# Patient Record
Sex: FEMALE | Race: WHITE | NOT HISPANIC OR LATINO | Employment: FULL TIME | URBAN - METROPOLITAN AREA
[De-identification: names, ages, dates, MRNs, and addresses within clinical notes are randomized per-mention and may not be internally consistent; named-entity substitution may affect disease eponyms.]

---

## 2017-01-17 ENCOUNTER — APPOINTMENT (OUTPATIENT)
Dept: WOMENS IMAGING | Facility: HOSPITAL | Age: 52
End: 2017-01-17

## 2017-01-17 PROCEDURE — 77067 SCR MAMMO BI INCL CAD: CPT | Performed by: RADIOLOGY

## 2017-07-28 ENCOUNTER — TELEPHONE (OUTPATIENT)
Dept: FAMILY MEDICINE CLINIC | Facility: CLINIC | Age: 52
End: 2017-07-28

## 2017-07-28 NOTE — TELEPHONE ENCOUNTER
Patient left a message stating that her blood pressure is elevated at 148/98 and 150/86. She asked to be seen today, however we do not have an appointment available. Please contact the patient to schedule next week with Dr. Ernandez.

## 2017-08-04 RX ORDER — SUCRALFATE 1 G/1
TABLET ORAL
Qty: 270 TABLET | Refills: 1 | OUTPATIENT
Start: 2017-08-04

## 2017-08-24 ENCOUNTER — TELEPHONE (OUTPATIENT)
Dept: GASTROENTEROLOGY | Facility: CLINIC | Age: 52
End: 2017-08-24

## 2017-08-24 RX ORDER — SUCRALFATE ORAL 1 G/10ML
1 SUSPENSION ORAL
Qty: 1200 ML | Refills: 0 | Status: SHIPPED | OUTPATIENT
Start: 2017-08-24 | End: 2022-08-01

## 2017-08-24 NOTE — TELEPHONE ENCOUNTER
----- Message from Meg Barber sent at 8/24/2017 10:09 AM EDT -----  Regarding: PT CALLED - SCRIPT FOR LIQUID CARAFATE  Contact: [phone]  PT ASK IF SHE COULD GO BACK TO THE LIQUID CARAFATE. IT WORKS BETTER THAN THE PILLS. PT SANAM O/V IN SEP.

## 2017-08-24 NOTE — TELEPHONE ENCOUNTER
Returned patient's phone call, advised will order Sucralfate suspension and to call back if her insurance will not pay for the suspension or is too expensive. She verb understanding.

## 2017-09-06 ENCOUNTER — OFFICE VISIT (OUTPATIENT)
Dept: GASTROENTEROLOGY | Facility: CLINIC | Age: 52
End: 2017-09-06

## 2017-09-06 VITALS
SYSTOLIC BLOOD PRESSURE: 122 MMHG | DIASTOLIC BLOOD PRESSURE: 82 MMHG | TEMPERATURE: 98.2 F | HEIGHT: 66 IN | BODY MASS INDEX: 26.48 KG/M2 | WEIGHT: 164.8 LBS

## 2017-09-06 DIAGNOSIS — K63.5 COLON POLYPS: ICD-10-CM

## 2017-09-06 DIAGNOSIS — R13.10 DYSPHAGIA, UNSPECIFIED TYPE: Primary | ICD-10-CM

## 2017-09-06 DIAGNOSIS — K21.9 GASTROESOPHAGEAL REFLUX DISEASE WITHOUT ESOPHAGITIS: ICD-10-CM

## 2017-09-06 PROCEDURE — 99213 OFFICE O/P EST LOW 20 MIN: CPT | Performed by: NURSE PRACTITIONER

## 2017-09-06 NOTE — PROGRESS NOTES
"Chief Complaint   Patient presents with   • Heartburn     HPI    51-year-old female patient of Dr. Wu's followed for history of GERD and colon polyps.  Last evaluated in the office in 2016 for GERD follow-up and medication refill.    Patient presents today stating that she has been off of all medications including her over-the-counter PPI and Carafate since shortly after her office follow-up in August of last year.  She reports that she has been doing well with no issues related to GERD until approximately 3-4 weeks ago.  She reports a shoulder injury and was placed on a 2 week course of ibuprofen as well as prednisone.  After completion of the medication she developed epigastric and stomach pain that she described as \"raw and inflamed feeling\".  She called the office and was restarted on Carafate approximately 2 weeks ago and restarted her over-the-counter PPI.  She reports significant improvement in the discomfort and the burning sensation.  But she does complain of \"acid in the back of her throat\" and feels as if something his hanging in her throat when she tries to swallow.  She will clear her throat and the sensation is alleviated.  She has not had to vomit to alleviate a food bolus.  She does have a lot of anxiety related to this as her brother is  from esophageal cancer.  Prior EGD was performed in 2014 with normal findings.    Prior colonoscopy performed in 2014 with findings of hyperplastic polyps.  She is in recall for a follow-up colonoscopy in 2019.    Past Medical History:   Diagnosis Date   • Asthma    • Foot fracture    • Hypothyroidism    • Tennis elbow    • Vitamin D deficiency      Past Surgical History:   Procedure Laterality Date   • CHOLECYSTECTOMY     • COLONOSCOPY W/ POLYPECTOMY  2014    One 7 mm hyperplastic polyp in transverse colon, incomplete resection, no-bleeding internal hemorrhoids, examined portion of ileum normal   • HYSTERECTOMY   "   • THYROIDECTOMY, PARTIAL      right side    • UPPER GASTROINTESTINAL ENDOSCOPY  12/09/2014    z-line irreg, mild chronic inflammation       Current Outpatient Prescriptions   Medication Sig Dispense Refill   • budesonide-formoterol (SYMBICORT) 80-4.5 MCG/ACT inhaler 2 (two) times a day.     • Cholecalciferol (VITAMIN D) 1000 UNITS tablet Take  by mouth.     • levothyroxine (SYNTHROID) 150 MCG tablet Take 1 tablet by mouth Daily. 90 tablet 3   • mometasone (NASONEX) 50 MCG/ACT nasal spray into each nostril daily.     • montelukast (SINGULAIR) 10 MG tablet Take  by mouth.     • omeprazole OTC (PRILOSEC OTC) 20 MG EC tablet Take  by mouth daily.     • sucralfate (CARAFATE) 1 GM/10ML suspension Take 10 mL by mouth 4 (Four) Times a Day With Meals & at Bedtime. 1200 mL 0     No current facility-administered medications for this visit.        PRN Meds:.    Allergies   Allergen Reactions   • Aspirin Other (See Comments)   • Dexlansoprazole        Social History     Social History   • Marital status: Single     Spouse name: N/A   • Number of children: N/A   • Years of education: N/A     Occupational History   • Not on file.     Social History Main Topics   • Smoking status: Former Smoker     Types: Cigarettes     Quit date: 8/22/2006   • Smokeless tobacco: Not on file   • Alcohol use No   • Drug use: No   • Sexual activity: Not on file     Other Topics Concern   • Not on file     Social History Narrative       Family History   Problem Relation Age of Onset   • Moore's esophagus Mother        Review of Systems   Constitutional: Negative for activity change, appetite change and unexpected weight change.   HENT: Positive for trouble swallowing.    Respiratory: Negative for cough and choking.    Gastrointestinal: Positive for abdominal pain and nausea. Negative for constipation, diarrhea and vomiting.   Allergic/Immunologic: Negative for immunocompromised state.       Vitals:    09/06/17 0931   BP: 122/82   Temp: 98.2 °F  "(36.8 °C)     /82  Temp 98.2 °F (36.8 °C)  Ht 66\" (167.6 cm)  Wt 164 lb 12.8 oz (74.8 kg)  BMI 26.6 kg/m2     Physical Exam   Constitutional: She is oriented to person, place, and time. She appears well-developed and well-nourished. No distress.   HENT:   Head: Normocephalic and atraumatic.   Eyes: No scleral icterus.   Cardiovascular: Normal rate and regular rhythm.    Pulmonary/Chest: Effort normal and breath sounds normal.   Abdominal: Soft. Bowel sounds are normal.   Mild epigastric tenderness   Neurological: She is alert and oriented to person, place, and time.   Skin: Skin is warm and dry.   Psychiatric: She has a normal mood and affect.   Vitals reviewed.      ASSESSMENT AND PLAN    Lise was seen today for heartburn.    Diagnoses and all orders for this visit:    Dysphagia, unspecified type  Comments:  Recent onset after taking steroids/motrin for shoulder injury. Plan for EGD  Orders:  -     Case Request; Standing  -     Case Request    Gastroesophageal reflux disease without esophagitis  Comments:  EGD 12/14 normal  Orders:  -     Case Request; Standing  -     Case Request    Colon polyps  Comments:  c-scope 12/20174 wtih HP, recall scope planned for 12/2019    Other orders  -     Obtain informed consent; Standing    Continue Carafate and over-the-counter PPI.  Plan for EGD for evaluation of dysphagia and GERD symptoms with recent NSAID and steroid use.  Further treatment plan pending outcome of endoscopy findings.  Recall colonoscopy planned for December 2019 with findings of prior colon polyps with pathology consistent with hyperplastic polyp        Nelli Best, JARRET   [facility]  [address] [place] [address]  Office: [phone]    "

## 2017-09-13 ENCOUNTER — OUTSIDE FACILITY SERVICE (OUTPATIENT)
Dept: GASTROENTEROLOGY | Facility: CLINIC | Age: 52
End: 2017-09-13

## 2017-09-13 PROCEDURE — 43239 EGD BIOPSY SINGLE/MULTIPLE: CPT | Performed by: INTERNAL MEDICINE

## 2017-09-19 ENCOUNTER — TELEPHONE (OUTPATIENT)
Dept: GASTROENTEROLOGY | Facility: CLINIC | Age: 52
End: 2017-09-19

## 2017-09-19 NOTE — TELEPHONE ENCOUNTER
Patient called, advised of Dr. Wu's note. She verb understanding and will call back for f/u appt.

## 2017-10-23 RX ORDER — SUCRALFATE 1 G/10ML
SUSPENSION ORAL
Qty: 1200 ML | Refills: 0 | OUTPATIENT
Start: 2017-10-23

## 2018-01-23 NOTE — TELEPHONE ENCOUNTER
----- Message from Ranjit Wu MD sent at 9/19/2017  4:19 PM EDT -----  Regarding: FW: Path / Procedure  Pathology is benign.  Office visit 4 mos      ----- Message -----     From: Eduar Jalloh     Sent: 9/19/2017   1:41 PM       To: Ranjit Wu MD  Subject: Path / Procedure                                 Scanned in     Statement Selected

## 2018-03-23 ENCOUNTER — APPOINTMENT (OUTPATIENT)
Dept: WOMENS IMAGING | Facility: HOSPITAL | Age: 53
End: 2018-03-23

## 2018-03-23 PROCEDURE — 77067 SCR MAMMO BI INCL CAD: CPT | Performed by: RADIOLOGY

## 2018-04-09 ENCOUNTER — TELEPHONE (OUTPATIENT)
Dept: ENDOCRINOLOGY | Age: 53
End: 2018-04-09

## 2018-04-09 NOTE — TELEPHONE ENCOUNTER
----- Message from JARRET Orellana sent at 4/6/2018  3:34 PM EDT -----  Contact: PATIENT   90 day no refill. Needs to keep appt or be seen sooner  ----- Message -----  From: Olga Lidia Bhagat MA  Sent: 4/6/2018   3:07 PM  To: JARRET Orellana    Patient has not been seen since 11/2016, do you want me to send any refills?    ----- Message -----  From: Francisca Paige  Sent: 4/6/2018   2:16 PM  To: Olga Lidia Bhagat MA    I SCHEDULED PATIENT AN APPT WITH [facility] 8-9-18 WITH LAB APPT 7-26-18 AND PUT HER ON WAIT LIST.     SHE IS ASKING FOR SYNTHROID SCRIPT, SHE DOES NOT HAVE STRENGTH AVAILABLE,  1XDAY,  90 DAY SUPPLY DUE TO IT WILL COST A LOT MORE IF SHE CAN ONLY GET A 30 DAY SUPPLY.     SHE HAS 2 PILLS LEFT     [facility] - [phone]    Paz sent in refill on 4/7/2018 for the medication listed above

## 2019-06-18 ENCOUNTER — APPOINTMENT (OUTPATIENT)
Dept: WOMENS IMAGING | Facility: HOSPITAL | Age: 54
End: 2019-06-18

## 2019-06-18 PROCEDURE — 77063 BREAST TOMOSYNTHESIS BI: CPT | Performed by: RADIOLOGY

## 2019-06-18 PROCEDURE — 77067 SCR MAMMO BI INCL CAD: CPT | Performed by: RADIOLOGY

## 2020-12-29 ENCOUNTER — APPOINTMENT (OUTPATIENT)
Dept: WOMENS IMAGING | Facility: HOSPITAL | Age: 55
End: 2020-12-29

## 2020-12-29 PROCEDURE — 77063 BREAST TOMOSYNTHESIS BI: CPT | Performed by: RADIOLOGY

## 2020-12-29 PROCEDURE — 77067 SCR MAMMO BI INCL CAD: CPT | Performed by: RADIOLOGY

## 2021-02-01 ENCOUNTER — APPOINTMENT (OUTPATIENT)
Dept: WOMENS IMAGING | Facility: HOSPITAL | Age: 56
End: 2021-02-01

## 2021-02-01 PROCEDURE — 76641 ULTRASOUND BREAST COMPLETE: CPT | Performed by: RADIOLOGY

## 2021-02-01 PROCEDURE — 77065 DX MAMMO INCL CAD UNI: CPT | Performed by: RADIOLOGY

## 2021-02-01 PROCEDURE — G0279 TOMOSYNTHESIS, MAMMO: HCPCS | Performed by: RADIOLOGY

## 2021-02-01 PROCEDURE — 77061 BREAST TOMOSYNTHESIS UNI: CPT | Performed by: RADIOLOGY

## 2021-02-28 ENCOUNTER — HOSPITAL ENCOUNTER (EMERGENCY)
Facility: HOSPITAL | Age: 56
Discharge: HOME | End: 2021-02-28
Attending: EMERGENCY MEDICINE | Admitting: EMERGENCY MEDICINE

## 2021-02-28 ENCOUNTER — APPOINTMENT (OUTPATIENT)
Dept: GENERAL RADIOLOGY | Facility: HOSPITAL | Age: 56
End: 2021-02-28

## 2021-02-28 VITALS
BODY MASS INDEX: 25.71 KG/M2 | RESPIRATION RATE: 16 BRPM | HEIGHT: 66 IN | TEMPERATURE: 98.5 F | SYSTOLIC BLOOD PRESSURE: 119 MMHG | HEART RATE: 82 BPM | OXYGEN SATURATION: 100 % | DIASTOLIC BLOOD PRESSURE: 63 MMHG | WEIGHT: 160 LBS

## 2021-02-28 DIAGNOSIS — R19.7 VOMITING AND DIARRHEA: Primary | ICD-10-CM

## 2021-02-28 DIAGNOSIS — U07.1 COVID-19 VIRUS INFECTION: ICD-10-CM

## 2021-02-28 DIAGNOSIS — R11.10 VOMITING AND DIARRHEA: Primary | ICD-10-CM

## 2021-02-28 LAB
ALBUMIN SERPL-MCNC: 3.8 G/DL (ref 3.5–5.2)
ALBUMIN/GLOB SERPL: 1.3 G/DL
ALP SERPL-CCNC: 62 U/L (ref 39–117)
ALT SERPL W P-5'-P-CCNC: 16 U/L (ref 1–33)
ANION GAP SERPL CALCULATED.3IONS-SCNC: 10.5 MMOL/L (ref 5–15)
AST SERPL-CCNC: 15 U/L (ref 1–32)
BACTERIA UR QL AUTO: ABNORMAL /HPF
BASOPHILS # BLD AUTO: 0.02 10*3/MM3 (ref 0–0.2)
BASOPHILS NFR BLD AUTO: 0.2 % (ref 0–1.5)
BILIRUB SERPL-MCNC: 0.2 MG/DL (ref 0–1.2)
BILIRUB UR QL STRIP: NEGATIVE
BUN SERPL-MCNC: 8 MG/DL (ref 6–20)
BUN/CREAT SERPL: 13.3 (ref 7–25)
CALCIUM SPEC-SCNC: 8.8 MG/DL (ref 8.6–10.5)
CHLORIDE SERPL-SCNC: 101 MMOL/L (ref 98–107)
CLARITY UR: CLEAR
CO2 SERPL-SCNC: 27.5 MMOL/L (ref 22–29)
COLOR UR: YELLOW
CREAT SERPL-MCNC: 0.6 MG/DL (ref 0.57–1)
DEPRECATED RDW RBC AUTO: 42.5 FL (ref 37–54)
EOSINOPHIL # BLD AUTO: 0.02 10*3/MM3 (ref 0–0.4)
EOSINOPHIL NFR BLD AUTO: 0.2 % (ref 0.3–6.2)
ERYTHROCYTE [DISTWIDTH] IN BLOOD BY AUTOMATED COUNT: 13.4 % (ref 12.3–15.4)
GFR SERPL CREATININE-BSD FRML MDRD: 103 ML/MIN/1.73
GLOBULIN UR ELPH-MCNC: 3 GM/DL
GLUCOSE SERPL-MCNC: 105 MG/DL (ref 65–99)
GLUCOSE UR STRIP-MCNC: NEGATIVE MG/DL
HCT VFR BLD AUTO: 31.3 % (ref 34–46.6)
HGB BLD-MCNC: 9.9 G/DL (ref 12–15.9)
HGB UR QL STRIP.AUTO: ABNORMAL
HYALINE CASTS UR QL AUTO: ABNORMAL /LPF
IMM GRANULOCYTES # BLD AUTO: 0.08 10*3/MM3 (ref 0–0.05)
IMM GRANULOCYTES NFR BLD AUTO: 0.8 % (ref 0–0.5)
KETONES UR QL STRIP: ABNORMAL
LEUKOCYTE ESTERASE UR QL STRIP.AUTO: ABNORMAL
LIPASE SERPL-CCNC: 22 U/L (ref 13–60)
LYMPHOCYTES # BLD AUTO: 0.67 10*3/MM3 (ref 0.7–3.1)
LYMPHOCYTES NFR BLD AUTO: 6.7 % (ref 19.6–45.3)
MCH RBC QN AUTO: 27.2 PG (ref 26.6–33)
MCHC RBC AUTO-ENTMCNC: 31.6 G/DL (ref 31.5–35.7)
MCV RBC AUTO: 86 FL (ref 79–97)
MONOCYTES # BLD AUTO: 0.33 10*3/MM3 (ref 0.1–0.9)
MONOCYTES NFR BLD AUTO: 3.3 % (ref 5–12)
NEUTROPHILS NFR BLD AUTO: 8.81 10*3/MM3 (ref 1.7–7)
NEUTROPHILS NFR BLD AUTO: 88.8 % (ref 42.7–76)
NITRITE UR QL STRIP: NEGATIVE
NRBC BLD AUTO-RTO: 0 /100 WBC (ref 0–0.2)
PH UR STRIP.AUTO: 6.5 [PH] (ref 5–8)
PLATELET # BLD AUTO: 246 10*3/MM3 (ref 140–450)
PMV BLD AUTO: 9.6 FL (ref 6–12)
POTASSIUM SERPL-SCNC: 3.8 MMOL/L (ref 3.5–5.2)
PROT SERPL-MCNC: 6.8 G/DL (ref 6–8.5)
PROT UR QL STRIP: NEGATIVE
RBC # BLD AUTO: 3.64 10*6/MM3 (ref 3.77–5.28)
RBC # UR: ABNORMAL /HPF
REF LAB TEST METHOD: ABNORMAL
SODIUM SERPL-SCNC: 139 MMOL/L (ref 136–145)
SP GR UR STRIP: <1.005 (ref 1–1.03)
SQUAMOUS #/AREA URNS HPF: ABNORMAL /HPF
UROBILINOGEN UR QL STRIP: ABNORMAL
WBC # BLD AUTO: 9.93 10*3/MM3 (ref 3.4–10.8)
WBC UR QL AUTO: ABNORMAL /HPF

## 2021-02-28 PROCEDURE — 80053 COMPREHEN METABOLIC PANEL: CPT | Performed by: NURSE PRACTITIONER

## 2021-02-28 PROCEDURE — 71045 X-RAY EXAM CHEST 1 VIEW: CPT

## 2021-02-28 PROCEDURE — 96361 HYDRATE IV INFUSION ADD-ON: CPT

## 2021-02-28 PROCEDURE — 83690 ASSAY OF LIPASE: CPT | Performed by: NURSE PRACTITIONER

## 2021-02-28 PROCEDURE — 81001 URINALYSIS AUTO W/SCOPE: CPT | Performed by: NURSE PRACTITIONER

## 2021-02-28 PROCEDURE — 25010000002 ONDANSETRON PER 1 MG: Performed by: NURSE PRACTITIONER

## 2021-02-28 PROCEDURE — 96375 TX/PRO/DX INJ NEW DRUG ADDON: CPT

## 2021-02-28 PROCEDURE — 85025 COMPLETE CBC W/AUTO DIFF WBC: CPT | Performed by: NURSE PRACTITIONER

## 2021-02-28 PROCEDURE — 25010000002 DIPHENHYDRAMINE PER 50 MG: Performed by: EMERGENCY MEDICINE

## 2021-02-28 PROCEDURE — 25010000002 METOCLOPRAMIDE PER 10 MG: Performed by: EMERGENCY MEDICINE

## 2021-02-28 PROCEDURE — 99284 EMERGENCY DEPT VISIT MOD MDM: CPT

## 2021-02-28 PROCEDURE — 96374 THER/PROPH/DIAG INJ IV PUSH: CPT

## 2021-02-28 RX ORDER — SODIUM CHLORIDE 0.9 % (FLUSH) 0.9 %
10 SYRINGE (ML) INJECTION AS NEEDED
Status: DISCONTINUED | OUTPATIENT
Start: 2021-02-28 | End: 2021-02-28 | Stop reason: HOSPADM

## 2021-02-28 RX ORDER — ONDANSETRON 2 MG/ML
4 INJECTION INTRAMUSCULAR; INTRAVENOUS ONCE
Status: COMPLETED | OUTPATIENT
Start: 2021-02-28 | End: 2021-02-28

## 2021-02-28 RX ORDER — METOCLOPRAMIDE HYDROCHLORIDE 5 MG/ML
10 INJECTION INTRAMUSCULAR; INTRAVENOUS ONCE
Status: COMPLETED | OUTPATIENT
Start: 2021-02-28 | End: 2021-02-28

## 2021-02-28 RX ORDER — PROMETHAZINE HYDROCHLORIDE 25 MG/1
25 TABLET ORAL EVERY 8 HOURS PRN
Qty: 20 TABLET | Refills: 0 | Status: SHIPPED | OUTPATIENT
Start: 2021-02-28 | End: 2022-08-01

## 2021-02-28 RX ORDER — DIPHENHYDRAMINE HYDROCHLORIDE 50 MG/ML
25 INJECTION INTRAMUSCULAR; INTRAVENOUS ONCE
Status: COMPLETED | OUTPATIENT
Start: 2021-02-28 | End: 2021-02-28

## 2021-02-28 RX ADMIN — SODIUM CHLORIDE 1000 ML: 9 INJECTION, SOLUTION INTRAVENOUS at 13:13

## 2021-02-28 RX ADMIN — METOCLOPRAMIDE HYDROCHLORIDE 10 MG: 5 INJECTION INTRAMUSCULAR; INTRAVENOUS at 12:49

## 2021-02-28 RX ADMIN — ONDANSETRON 4 MG: 2 INJECTION INTRAMUSCULAR; INTRAVENOUS at 12:10

## 2021-02-28 RX ADMIN — DIPHENHYDRAMINE HYDROCHLORIDE 25 MG: 50 INJECTION, SOLUTION INTRAMUSCULAR; INTRAVENOUS at 12:49

## 2021-02-28 RX ADMIN — SODIUM CHLORIDE 1000 ML: 9 INJECTION, SOLUTION INTRAVENOUS at 12:07

## 2022-08-01 ENCOUNTER — PATIENT ROUNDING (BHMG ONLY) (OUTPATIENT)
Dept: GASTROENTEROLOGY | Facility: CLINIC | Age: 57
End: 2022-08-01

## 2022-08-01 ENCOUNTER — OFFICE VISIT (OUTPATIENT)
Dept: GASTROENTEROLOGY | Facility: CLINIC | Age: 57
End: 2022-08-01

## 2022-08-01 VITALS
WEIGHT: 170 LBS | TEMPERATURE: 97 F | BODY MASS INDEX: 27.32 KG/M2 | DIASTOLIC BLOOD PRESSURE: 75 MMHG | HEIGHT: 66 IN | HEART RATE: 71 BPM | SYSTOLIC BLOOD PRESSURE: 136 MMHG

## 2022-08-01 DIAGNOSIS — D12.2 ADENOMATOUS POLYP OF ASCENDING COLON: Primary | ICD-10-CM

## 2022-08-01 PROCEDURE — 99204 OFFICE O/P NEW MOD 45 MIN: CPT | Performed by: INTERNAL MEDICINE

## 2022-08-01 NOTE — PROGRESS NOTES
August 1, 2022    Hello, may I speak with Lise Wilhelm?    My name is Keysha Whitfield      I am  with [facility]  [address] [facility] [address] [address] [place] [id number] [phone].    Before we get started may I verify your date of birth? 1965    I am calling to officially welcome you to our practice and ask about your recent visit. Is this a good time to talk? Yes.    Tell me about your visit with us. What things went well?  Everything went well with my visit and I really like the provider I saw today.        We're always looking for ways to make our patients' experiences even better. Do you have recommendations on ways we may improve?  No. Everything went well.     Overall were you satisfied with your first visit to our practice? Yes.        I appreciate you taking the time to speak with me today. Is there anything else I can do for you? No.       Thank you, and have a great day.

## 2022-08-01 NOTE — PROGRESS NOTES
Chief Complaint   Patient presents with   • Heartburn     Subjective     HPI  Lise Wilhelm is a 56 y.o. female who presents today for new office visit  Heartburn well controlled with omeprazole over-the-counter, no dysphagia vomiting or weight loss that would require EGD    He has a history of 1 colon polyp removed 7 years ago she is due for colonoscopy at this time  No rectal bleeding change in bowel habits    She does believe she has an external hemorrhoid    Objective   Vitals:    08/01/22 1307   BP: 136/75   Pulse: 71   Temp: 97 °F (36.1 °C)       Physical Exam  Vitals reviewed.   Constitutional:       Appearance: She is well-developed.   HENT:      Head: Normocephalic and atraumatic.   Abdominal:      General: Bowel sounds are normal. There is no distension.      Palpations: Abdomen is soft. There is no mass.      Tenderness: There is no abdominal tenderness.      Hernia: No hernia is present.   Skin:     General: Skin is warm and dry.   Neurological:      Mental Status: She is alert and oriented to person, place, and time.   Psychiatric:         Behavior: Behavior normal.         Thought Content: Thought content normal.         Judgment: Judgment normal.       The following data was reviewed by: Ranjit Wu MD on 08/01/2022:  Common labs    Common Labsle 7/25/22   Hemoglobin A1C 5.5               Colonoscopy 2015 with removal of 1 colon polyp    Assessment & Plan   Assessment:     Heartburn well controlled with omeprazole  Colon polyp      Plan:   Schedule colonoscopy no indication for EGD  GERD lifestyle modifications discussed  Continue over-the-counter Prilosec    I spent 30 minutes caring for Lise on this date of service. This time includes time spent by me in the following activities:preparing for the visit, reviewing tests, obtaining and/or reviewing a separately obtained history, performing a medically appropriate examination and/or evaluation , counseling and educating the  patient/family/caregiver, ordering medications, tests, or procedures, referring and communicating with other health care professionals , documenting information in the medical record, independently interpreting results and communicating that information with the patient/family/caregiver and care coordination    Ranjit Wu MD  [facility]  [address] [place] [address]  Office: [phone]   The patient is a 20y Female complaining of fever.

## 2022-09-23 ENCOUNTER — TELEPHONE (OUTPATIENT)
Dept: GASTROENTEROLOGY | Facility: CLINIC | Age: 57
End: 2022-09-23

## 2022-12-23 ENCOUNTER — ANESTHESIA (OUTPATIENT)
Dept: GASTROENTEROLOGY | Facility: HOSPITAL | Age: 57
End: 2022-12-23

## 2022-12-23 ENCOUNTER — HOSPITAL ENCOUNTER (OUTPATIENT)
Facility: HOSPITAL | Age: 57
Setting detail: HOSPITAL OUTPATIENT SURGERY
Discharge: HOME | End: 2022-12-23
Attending: INTERNAL MEDICINE | Admitting: INTERNAL MEDICINE

## 2022-12-23 ENCOUNTER — ANESTHESIA EVENT (OUTPATIENT)
Dept: GASTROENTEROLOGY | Facility: HOSPITAL | Age: 57
End: 2022-12-23

## 2022-12-23 VITALS
DIASTOLIC BLOOD PRESSURE: 82 MMHG | HEART RATE: 58 BPM | SYSTOLIC BLOOD PRESSURE: 129 MMHG | OXYGEN SATURATION: 100 % | BODY MASS INDEX: 26.79 KG/M2 | HEIGHT: 66 IN | WEIGHT: 166.7 LBS | RESPIRATION RATE: 20 BRPM

## 2022-12-23 PROCEDURE — 25010000002 PROPOFOL 10 MG/ML EMULSION: Performed by: REGISTERED NURSE

## 2022-12-23 PROCEDURE — S0260 H&P FOR SURGERY: HCPCS | Performed by: INTERNAL MEDICINE

## 2022-12-23 PROCEDURE — 45378 DIAGNOSTIC COLONOSCOPY: CPT | Performed by: INTERNAL MEDICINE

## 2022-12-23 RX ORDER — LIDOCAINE HYDROCHLORIDE 20 MG/ML
INJECTION, SOLUTION INFILTRATION; PERINEURAL AS NEEDED
Status: DISCONTINUED | OUTPATIENT
Start: 2022-12-23 | End: 2022-12-23 | Stop reason: SURG

## 2022-12-23 RX ORDER — PROPOFOL 10 MG/ML
VIAL (ML) INTRAVENOUS AS NEEDED
Status: DISCONTINUED | OUTPATIENT
Start: 2022-12-23 | End: 2022-12-23 | Stop reason: SURG

## 2022-12-23 RX ORDER — SODIUM CHLORIDE, SODIUM LACTATE, POTASSIUM CHLORIDE, CALCIUM CHLORIDE 600; 310; 30; 20 MG/100ML; MG/100ML; MG/100ML; MG/100ML
30 INJECTION, SOLUTION INTRAVENOUS CONTINUOUS
Status: DISCONTINUED | OUTPATIENT
Start: 2022-12-23 | End: 2022-12-23 | Stop reason: HOSPADM

## 2022-12-23 RX ADMIN — SODIUM CHLORIDE, POTASSIUM CHLORIDE, SODIUM LACTATE AND CALCIUM CHLORIDE 30 ML/HR: 600; 310; 30; 20 INJECTION, SOLUTION INTRAVENOUS at 09:56

## 2022-12-23 RX ADMIN — PROPOFOL 60 MG: 10 INJECTION, EMULSION INTRAVENOUS at 10:47

## 2022-12-23 RX ADMIN — LIDOCAINE HYDROCHLORIDE 60 MG: 20 INJECTION, SOLUTION INFILTRATION; PERINEURAL at 10:47

## 2022-12-23 RX ADMIN — PROPOFOL 180 MCG/KG/MIN: 10 INJECTION, EMULSION INTRAVENOUS at 10:47

## 2022-12-23 NOTE — ANESTHESIA POSTPROCEDURE EVALUATION
"Patient: Lise Wilhelm    Procedure Summary     Date: 12/23/22 Room / Location: [facility] ENDOSCOPY 8 /  [facility]    Anesthesia Start: 1043 Anesthesia Stop: 1109    Procedure: COLONOSCOPY INTO CECUM AND T.I. Diagnosis:       Adenomatous polyp of ascending colon      (Adenomatous polyp of ascending colon [D12.2])    Surgeons: Ranjit Wu MD Provider: Reuben Suarez MD    Anesthesia Type: MAC ASA Status: 2          Anesthesia Type: MAC    Vitals  Vitals Value Taken Time   /82 12/23/22 1126   Temp     Pulse 58 12/23/22 1126   Resp 20 12/23/22 1126   SpO2 100 % 12/23/22 1126           Post Anesthesia Care and Evaluation    Patient location during evaluation: bedside  Patient participation: complete - patient participated  Level of consciousness: sleepy but conscious  Pain score: 0  Pain management: adequate    Airway patency: patent  Anesthetic complications: No anesthetic complications    Cardiovascular status: acceptable  Respiratory status: acceptable  Hydration status: acceptable    Comments: /82   Pulse 58   Resp 20   Ht 167.6 cm (66\")   Wt 75.6 kg (166 lb 11.2 oz)   SpO2 100%   BMI 26.91 kg/m²         "

## 2022-12-23 NOTE — H&P
"[facility]  Pre Procedure History & Physical    Chief Complaint:   Time for my colonoscopy    Subjective     HPI:   56 y.o. female presenting to endoscopy unit today for surveillance colonoscopy.    Past Medical History:   Past Medical History:   Diagnosis Date   • Asthma    • Foot fracture    • Heartburn    • Hypothyroidism    • Tennis elbow    • Vitamin D deficiency        Family History:  Family History   Problem Relation Age of Onset   • Moore's esophagus Mother        Social History:   reports that she quit smoking about 16 years ago. Her smoking use included cigarettes. She does not have any smokeless tobacco history on file. She reports that she does not drink alcohol and does not use drugs.    Medications:   Medications Prior to Admission   Medication Sig Dispense Refill Last Dose   • budesonide-formoterol (SYMBICORT) 80-4.5 MCG/ACT inhaler 2 (two) times a day.   12/23/2022   • omeprazole OTC (PriLOSEC OTC) 20 MG EC tablet Take  by mouth daily.   12/22/2022   • Cholecalciferol (VITAMIN D) 1000 UNITS tablet Take  by mouth.      • levothyroxine (SYNTHROID) 150 MCG tablet Take 1 tablet by mouth Daily. 90 tablet 3    • mometasone (NASONEX) 50 MCG/ACT nasal spray into each nostril daily.      • montelukast (SINGULAIR) 10 MG tablet Take  by mouth.          Allergies:  Aspirin and Dexlansoprazole    ROS:    Pertinent items are noted in HPI     Objective     Blood pressure 147/74, pulse 74, resp. rate 14, height 167.6 cm (66\"), weight 75.6 kg (166 lb 11.2 oz), SpO2 99 %.    Physical Exam   Constitutional: Pt is oriented to person, place, and time and well-developed, well-nourished, and in no distress.   Abdominal: Soft.   Psychiatric: Mood, memory, affect and judgment normal.     Assessment & Plan     Diagnosis:  Personal hx of colon polyps    Anticipated Surgical Procedure:  Colonoscopy    The risks, benefits, and alternatives of this procedure have been discussed with the patient or the " responsible party- the patient understands and agrees to proceed.

## 2022-12-23 NOTE — ANESTHESIA PREPROCEDURE EVALUATION
Anesthesia Evaluation     Patient summary reviewed and Nursing notes reviewed   NPO Solid Status: > 8 hours  NPO Liquid Status: > 4 hours           Airway   Mallampati: II  Neck ROM: full  No difficulty expected  Dental - normal exam     Pulmonary     breath sounds clear to auscultation  (+) a smoker Former, asthma,  Cardiovascular     Rhythm: regular        Neuro/Psych  GI/Hepatic/Renal/Endo    (+)  GERD,  thyroid problem hypothyroidism    Musculoskeletal     Abdominal    Substance History      OB/GYN          Other   arthritis,                      Anesthesia Plan    ASA 2     MAC     intravenous induction     Anesthetic plan, risks, benefits, and alternatives have been provided, discussed and informed consent has been obtained with: patient.        CODE STATUS:

## (undated) DEVICE — KT ORCA ORCAPOD DISP STRL

## (undated) DEVICE — ADAPT CLN BIOGUARD AIR/H2O DISP

## (undated) DEVICE — TUBING, SUCTION, 1/4" X 10', STRAIGHT: Brand: MEDLINE

## (undated) DEVICE — SENSR O2 OXIMAX FNGR A/ 18IN NONSTR

## (undated) DEVICE — LN SMPL CO2 SHTRM SD STREAM W/M LUER

## (undated) DEVICE — CANN O2 ETCO2 FITS ALL CONN CO2 SMPL A/ 7IN DISP LF